# Patient Record
Sex: MALE | Race: WHITE | Employment: UNEMPLOYED | URBAN - NONMETROPOLITAN AREA
[De-identification: names, ages, dates, MRNs, and addresses within clinical notes are randomized per-mention and may not be internally consistent; named-entity substitution may affect disease eponyms.]

---

## 2019-09-21 ENCOUNTER — APPOINTMENT (OUTPATIENT)
Dept: INTERVENTIONAL RADIOLOGY/VASCULAR | Age: 59
DRG: 854 | End: 2019-09-21
Payer: COMMERCIAL

## 2019-09-21 ENCOUNTER — HOSPITAL ENCOUNTER (INPATIENT)
Age: 59
LOS: 3 days | Discharge: HOME | DRG: 854 | End: 2019-09-24
Attending: INTERNAL MEDICINE | Admitting: INTERNAL MEDICINE
Payer: COMMERCIAL

## 2019-09-21 ENCOUNTER — APPOINTMENT (OUTPATIENT)
Dept: ULTRASOUND IMAGING | Age: 59
DRG: 854 | End: 2019-09-21
Payer: COMMERCIAL

## 2019-09-21 ENCOUNTER — APPOINTMENT (OUTPATIENT)
Dept: GENERAL RADIOLOGY | Age: 59
DRG: 854 | End: 2019-09-21
Payer: COMMERCIAL

## 2019-09-21 DIAGNOSIS — R65.10 SIRS (SYSTEMIC INFLAMMATORY RESPONSE SYNDROME) (HCC): Primary | ICD-10-CM

## 2019-09-21 DIAGNOSIS — M86.172 OTHER ACUTE OSTEOMYELITIS OF LEFT FOOT (HCC): ICD-10-CM

## 2019-09-21 DIAGNOSIS — Z79.4 TYPE 2 DIABETES MELLITUS WITH DIABETIC PERIPHERAL ANGIOPATHY AND GANGRENE, WITH LONG-TERM CURRENT USE OF INSULIN (HCC): ICD-10-CM

## 2019-09-21 DIAGNOSIS — E11.52 TYPE 2 DIABETES MELLITUS WITH DIABETIC PERIPHERAL ANGIOPATHY AND GANGRENE, WITH LONG-TERM CURRENT USE OF INSULIN (HCC): ICD-10-CM

## 2019-09-21 PROBLEM — E11.59 TYPE 2 DIABETES MELLITUS WITH CIRCULATORY DISORDER (HCC): Status: ACTIVE | Noted: 2019-09-21

## 2019-09-21 PROBLEM — E87.1 HYPONATREMIA: Status: ACTIVE | Noted: 2019-09-21

## 2019-09-21 PROBLEM — E83.42 HYPOMAGNESEMIA: Status: ACTIVE | Noted: 2019-09-21

## 2019-09-21 LAB
ALBUMIN SERPL-MCNC: 3.4 G/DL (ref 3.5–5.1)
ALP BLD-CCNC: 84 U/L (ref 38–126)
ALT SERPL-CCNC: 23 U/L (ref 11–66)
ANION GAP SERPL CALCULATED.3IONS-SCNC: 14 MEQ/L (ref 8–16)
AST SERPL-CCNC: 30 U/L (ref 5–40)
AVERAGE GLUCOSE: 126 MG/DL (ref 70–126)
BACTERIA: NORMAL
BASOPHILS # BLD: 0.6 %
BASOPHILS ABSOLUTE: 0.1 THOU/MM3 (ref 0–0.1)
BILIRUB SERPL-MCNC: 0.7 MG/DL (ref 0.3–1.2)
BILIRUBIN DIRECT: < 0.2 MG/DL (ref 0–0.3)
BILIRUBIN URINE: NEGATIVE
BLOOD, URINE: NEGATIVE
BUN BLDV-MCNC: 8 MG/DL (ref 7–22)
CALCIUM SERPL-MCNC: 8.7 MG/DL (ref 8.5–10.5)
CASTS: NORMAL /LPF
CASTS: NORMAL /LPF
CHARACTER, URINE: CLEAR
CHLORIDE BLD-SCNC: 96 MEQ/L (ref 98–111)
CO2: 21 MEQ/L (ref 23–33)
COLOR: YELLOW
CREAT SERPL-MCNC: 0.7 MG/DL (ref 0.4–1.2)
CRYSTALS: NORMAL
EKG ATRIAL RATE: 99 BPM
EKG P AXIS: 45 DEGREES
EKG P-R INTERVAL: 148 MS
EKG Q-T INTERVAL: 360 MS
EKG QRS DURATION: 90 MS
EKG QTC CALCULATION (BAZETT): 462 MS
EKG R AXIS: 78 DEGREES
EKG T AXIS: 58 DEGREES
EKG VENTRICULAR RATE: 99 BPM
EOSINOPHIL # BLD: 0.6 %
EOSINOPHILS ABSOLUTE: 0.1 THOU/MM3 (ref 0–0.4)
EPITHELIAL CELLS, UA: NORMAL /HPF
ERYTHROCYTE [DISTWIDTH] IN BLOOD BY AUTOMATED COUNT: 12.8 % (ref 11.5–14.5)
ERYTHROCYTE [DISTWIDTH] IN BLOOD BY AUTOMATED COUNT: 47.6 FL (ref 35–45)
GFR SERPL CREATININE-BSD FRML MDRD: > 90 ML/MIN/1.73M2
GLUCOSE BLD-MCNC: 126 MG/DL (ref 70–108)
GLUCOSE BLD-MCNC: 133 MG/DL (ref 70–108)
GLUCOSE BLD-MCNC: 169 MG/DL (ref 70–108)
GLUCOSE BLD-MCNC: 182 MG/DL (ref 70–108)
GLUCOSE, URINE: NEGATIVE MG/DL
HBA1C MFR BLD: 6.2 % (ref 4.4–6.4)
HCT VFR BLD CALC: 36 % (ref 42–52)
HEMOGLOBIN: 12.4 GM/DL (ref 14–18)
IMMATURE GRANS (ABS): 0.06 THOU/MM3 (ref 0–0.07)
IMMATURE GRANULOCYTES: 1 %
KETONES, URINE: NEGATIVE
LACTIC ACID, SEPSIS: 1.4 MMOL/L (ref 0.5–1.9)
LACTIC ACID: 1.7 MMOL/L (ref 0.5–2.2)
LEUKOCYTE ESTERASE, URINE: NEGATIVE
LYMPHOCYTES # BLD: 7 %
LYMPHOCYTES ABSOLUTE: 0.9 THOU/MM3 (ref 1–4.8)
MAGNESIUM: 1.4 MG/DL (ref 1.6–2.4)
MCH RBC QN AUTO: 35.6 PG (ref 26–33)
MCHC RBC AUTO-ENTMCNC: 34.4 GM/DL (ref 32.2–35.5)
MCV RBC AUTO: 103.4 FL (ref 80–94)
MISCELLANEOUS LAB TEST RESULT: NORMAL
MONOCYTES # BLD: 8.1 %
MONOCYTES ABSOLUTE: 1 THOU/MM3 (ref 0.4–1.3)
NITRITE, URINE: NEGATIVE
NUCLEATED RED BLOOD CELLS: 0 /100 WBC
OSMOLALITY CALCULATION: 265.6 MOSMOL/KG (ref 275–300)
PH UA: 6.5 (ref 5–9)
PLATELET # BLD: 194 THOU/MM3 (ref 130–400)
PMV BLD AUTO: 9.9 FL (ref 9.4–12.4)
POTASSIUM SERPL-SCNC: 3.7 MEQ/L (ref 3.5–5.2)
PROTEIN UA: NEGATIVE MG/DL
RBC # BLD: 3.48 MILL/MM3 (ref 4.7–6.1)
RBC URINE: NORMAL /HPF
RENAL EPITHELIAL, UA: NORMAL
SEG NEUTROPHILS: 83.2 %
SEGMENTED NEUTROPHILS ABSOLUTE COUNT: 10.2 THOU/MM3 (ref 1.8–7.7)
SODIUM BLD-SCNC: 131 MEQ/L (ref 135–145)
SPECIFIC GRAVITY UA: 1.01 (ref 1–1.03)
TOTAL PROTEIN: 7 G/DL (ref 6.1–8)
UROBILINOGEN, URINE: 1 EU/DL (ref 0–1)
WBC # BLD: 12.3 THOU/MM3 (ref 4.8–10.8)
WBC UA: NORMAL /HPF
YEAST: NORMAL

## 2019-09-21 PROCEDURE — 80053 COMPREHEN METABOLIC PANEL: CPT

## 2019-09-21 PROCEDURE — 6370000000 HC RX 637 (ALT 250 FOR IP): Performed by: INTERNAL MEDICINE

## 2019-09-21 PROCEDURE — 97165 OT EVAL LOW COMPLEX 30 MIN: CPT

## 2019-09-21 PROCEDURE — 83605 ASSAY OF LACTIC ACID: CPT

## 2019-09-21 PROCEDURE — 99284 EMERGENCY DEPT VISIT MOD MDM: CPT

## 2019-09-21 PROCEDURE — 87077 CULTURE AEROBIC IDENTIFY: CPT

## 2019-09-21 PROCEDURE — 6360000002 HC RX W HCPCS: Performed by: NURSE PRACTITIONER

## 2019-09-21 PROCEDURE — 83735 ASSAY OF MAGNESIUM: CPT

## 2019-09-21 PROCEDURE — 0JBR0ZZ EXCISION OF LEFT FOOT SUBCUTANEOUS TISSUE AND FASCIA, OPEN APPROACH: ICD-10-PCS | Performed by: PODIATRIST

## 2019-09-21 PROCEDURE — 6370000000 HC RX 637 (ALT 250 FOR IP): Performed by: NURSE PRACTITIONER

## 2019-09-21 PROCEDURE — 2709999900 HC NON-CHARGEABLE SUPPLY

## 2019-09-21 PROCEDURE — 36415 COLL VENOUS BLD VENIPUNCTURE: CPT

## 2019-09-21 PROCEDURE — 81001 URINALYSIS AUTO W/SCOPE: CPT

## 2019-09-21 PROCEDURE — 82948 REAGENT STRIP/BLOOD GLUCOSE: CPT

## 2019-09-21 PROCEDURE — 93971 EXTREMITY STUDY: CPT

## 2019-09-21 PROCEDURE — 6360000002 HC RX W HCPCS: Performed by: INTERNAL MEDICINE

## 2019-09-21 PROCEDURE — 96365 THER/PROPH/DIAG IV INF INIT: CPT

## 2019-09-21 PROCEDURE — 82248 BILIRUBIN DIRECT: CPT

## 2019-09-21 PROCEDURE — 1200000003 HC TELEMETRY R&B

## 2019-09-21 PROCEDURE — 87070 CULTURE OTHR SPECIMN AEROBIC: CPT

## 2019-09-21 PROCEDURE — 83036 HEMOGLOBIN GLYCOSYLATED A1C: CPT

## 2019-09-21 PROCEDURE — 85025 COMPLETE CBC W/AUTO DIFF WBC: CPT

## 2019-09-21 PROCEDURE — 87147 CULTURE TYPE IMMUNOLOGIC: CPT

## 2019-09-21 PROCEDURE — 87075 CULTR BACTERIA EXCEPT BLOOD: CPT

## 2019-09-21 PROCEDURE — 99223 1ST HOSP IP/OBS HIGH 75: CPT | Performed by: INTERNAL MEDICINE

## 2019-09-21 PROCEDURE — 87040 BLOOD CULTURE FOR BACTERIA: CPT

## 2019-09-21 PROCEDURE — 87186 SC STD MICRODIL/AGAR DIL: CPT

## 2019-09-21 PROCEDURE — 71046 X-RAY EXAM CHEST 2 VIEWS: CPT

## 2019-09-21 PROCEDURE — 87205 SMEAR GRAM STAIN: CPT

## 2019-09-21 PROCEDURE — 94760 N-INVAS EAR/PLS OXIMETRY 1: CPT

## 2019-09-21 PROCEDURE — 2580000003 HC RX 258: Performed by: INTERNAL MEDICINE

## 2019-09-21 PROCEDURE — 93005 ELECTROCARDIOGRAM TRACING: CPT | Performed by: INTERNAL MEDICINE

## 2019-09-21 PROCEDURE — 2580000003 HC RX 258: Performed by: NURSE PRACTITIONER

## 2019-09-21 PROCEDURE — 73630 X-RAY EXAM OF FOOT: CPT

## 2019-09-21 RX ORDER — POTASSIUM CHLORIDE 20 MEQ/1
40 TABLET, EXTENDED RELEASE ORAL PRN
Status: DISCONTINUED | OUTPATIENT
Start: 2019-09-21 | End: 2019-09-24 | Stop reason: HOSPADM

## 2019-09-21 RX ORDER — 0.9 % SODIUM CHLORIDE 0.9 %
1000 INTRAVENOUS SOLUTION INTRAVENOUS ONCE
Status: COMPLETED | OUTPATIENT
Start: 2019-09-21 | End: 2019-09-21

## 2019-09-21 RX ORDER — DEXTROSE MONOHYDRATE 50 MG/ML
100 INJECTION, SOLUTION INTRAVENOUS PRN
Status: DISCONTINUED | OUTPATIENT
Start: 2019-09-21 | End: 2019-09-24 | Stop reason: HOSPADM

## 2019-09-21 RX ORDER — ONDANSETRON 2 MG/ML
4 INJECTION INTRAMUSCULAR; INTRAVENOUS EVERY 6 HOURS PRN
Status: DISCONTINUED | OUTPATIENT
Start: 2019-09-21 | End: 2019-09-24 | Stop reason: HOSPADM

## 2019-09-21 RX ORDER — ACETAMINOPHEN 325 MG/1
650 TABLET ORAL ONCE
Status: COMPLETED | OUTPATIENT
Start: 2019-09-21 | End: 2019-09-21

## 2019-09-21 RX ORDER — ACETAMINOPHEN 325 MG/1
650 TABLET ORAL EVERY 4 HOURS PRN
Status: DISCONTINUED | OUTPATIENT
Start: 2019-09-21 | End: 2019-09-24 | Stop reason: HOSPADM

## 2019-09-21 RX ORDER — SODIUM CHLORIDE 0.9 % (FLUSH) 0.9 %
10 SYRINGE (ML) INJECTION EVERY 12 HOURS SCHEDULED
Status: DISCONTINUED | OUTPATIENT
Start: 2019-09-21 | End: 2019-09-24 | Stop reason: HOSPADM

## 2019-09-21 RX ORDER — SODIUM CHLORIDE 9 MG/ML
INJECTION, SOLUTION INTRAVENOUS CONTINUOUS
Status: DISCONTINUED | OUTPATIENT
Start: 2019-09-21 | End: 2019-09-24 | Stop reason: HOSPADM

## 2019-09-21 RX ORDER — POLYETHYLENE GLYCOL 3350 17 G/17G
17 POWDER, FOR SOLUTION ORAL DAILY
Status: DISCONTINUED | OUTPATIENT
Start: 2019-09-21 | End: 2019-09-24 | Stop reason: HOSPADM

## 2019-09-21 RX ORDER — DEXTROSE MONOHYDRATE 25 G/50ML
12.5 INJECTION, SOLUTION INTRAVENOUS PRN
Status: DISCONTINUED | OUTPATIENT
Start: 2019-09-21 | End: 2019-09-24 | Stop reason: HOSPADM

## 2019-09-21 RX ORDER — KETOROLAC TROMETHAMINE 30 MG/ML
30 INJECTION, SOLUTION INTRAMUSCULAR; INTRAVENOUS ONCE
Status: COMPLETED | OUTPATIENT
Start: 2019-09-21 | End: 2019-09-21

## 2019-09-21 RX ORDER — SODIUM CHLORIDE 0.9 % (FLUSH) 0.9 %
10 SYRINGE (ML) INJECTION PRN
Status: DISCONTINUED | OUTPATIENT
Start: 2019-09-21 | End: 2019-09-24 | Stop reason: HOSPADM

## 2019-09-21 RX ORDER — NICOTINE POLACRILEX 4 MG
15 LOZENGE BUCCAL PRN
Status: DISCONTINUED | OUTPATIENT
Start: 2019-09-21 | End: 2019-09-24 | Stop reason: HOSPADM

## 2019-09-21 RX ORDER — POTASSIUM CHLORIDE 7.45 MG/ML
10 INJECTION INTRAVENOUS PRN
Status: DISCONTINUED | OUTPATIENT
Start: 2019-09-21 | End: 2019-09-24 | Stop reason: HOSPADM

## 2019-09-21 RX ORDER — FAMOTIDINE 20 MG/1
20 TABLET, FILM COATED ORAL 2 TIMES DAILY
Status: DISCONTINUED | OUTPATIENT
Start: 2019-09-21 | End: 2019-09-24 | Stop reason: HOSPADM

## 2019-09-21 RX ADMIN — INSULIN LISPRO 2 UNITS: 100 INJECTION, SOLUTION INTRAVENOUS; SUBCUTANEOUS at 18:04

## 2019-09-21 RX ADMIN — SODIUM CHLORIDE 1000 ML: 9 INJECTION, SOLUTION INTRAVENOUS at 08:45

## 2019-09-21 RX ADMIN — CEFTRIAXONE SODIUM 1 G: 1 INJECTION, POWDER, FOR SOLUTION INTRAMUSCULAR; INTRAVENOUS at 10:33

## 2019-09-21 RX ADMIN — ACETAMINOPHEN 650 MG: 325 TABLET ORAL at 21:17

## 2019-09-21 RX ADMIN — VANCOMYCIN HYDROCHLORIDE 1500 MG: 1 INJECTION, POWDER, LYOPHILIZED, FOR SOLUTION INTRAVENOUS at 11:25

## 2019-09-21 RX ADMIN — MAGNESIUM SULFATE HEPTAHYDRATE 1 G: 500 INJECTION, SOLUTION INTRAMUSCULAR; INTRAVENOUS at 12:42

## 2019-09-21 RX ADMIN — CEFTRIAXONE SODIUM 1 G: 1 INJECTION, POWDER, FOR SOLUTION INTRAMUSCULAR; INTRAVENOUS at 21:19

## 2019-09-21 RX ADMIN — SODIUM CHLORIDE: 9 INJECTION, SOLUTION INTRAVENOUS at 12:39

## 2019-09-21 RX ADMIN — ACETAMINOPHEN 650 MG: 325 TABLET ORAL at 08:45

## 2019-09-21 RX ADMIN — KETOROLAC TROMETHAMINE 30 MG: 30 INJECTION, SOLUTION INTRAMUSCULAR at 11:19

## 2019-09-21 RX ADMIN — ACETAMINOPHEN 650 MG: 325 TABLET ORAL at 15:05

## 2019-09-21 RX ADMIN — FAMOTIDINE 20 MG: 20 TABLET ORAL at 21:18

## 2019-09-21 RX ADMIN — VANCOMYCIN HYDROCHLORIDE 1750 MG: 5 INJECTION, POWDER, LYOPHILIZED, FOR SOLUTION INTRAVENOUS at 21:11

## 2019-09-21 ASSESSMENT — ENCOUNTER SYMPTOMS
VOMITING: 0
WHEEZING: 0
ABDOMINAL DISTENTION: 0
CHEST TIGHTNESS: 0
DIARRHEA: 0
CONSTIPATION: 0
NAUSEA: 0
SHORTNESS OF BREATH: 0
EYE REDNESS: 0
BACK PAIN: 0
COUGH: 0
VOICE CHANGE: 0
COLOR CHANGE: 1
RHINORRHEA: 0
SORE THROAT: 0
BLOOD IN STOOL: 0
PHOTOPHOBIA: 0
ABDOMINAL PAIN: 0
SINUS PRESSURE: 0

## 2019-09-21 ASSESSMENT — PAIN DESCRIPTION - LOCATION
LOCATION: LEG;FOOT
LOCATION: FOOT
LOCATION: LEG;FOOT
LOCATION: FOOT

## 2019-09-21 ASSESSMENT — PAIN DESCRIPTION - FREQUENCY
FREQUENCY: CONTINUOUS

## 2019-09-21 ASSESSMENT — PAIN SCALES - GENERAL
PAINLEVEL_OUTOF10: 5
PAINLEVEL_OUTOF10: 6
PAINLEVEL_OUTOF10: 5
PAINLEVEL_OUTOF10: 4

## 2019-09-21 ASSESSMENT — PAIN DESCRIPTION - PROGRESSION
CLINICAL_PROGRESSION: NOT CHANGED
CLINICAL_PROGRESSION: NOT CHANGED

## 2019-09-21 ASSESSMENT — PAIN DESCRIPTION - PAIN TYPE
TYPE: ACUTE PAIN

## 2019-09-21 ASSESSMENT — PAIN DESCRIPTION - DESCRIPTORS
DESCRIPTORS: ACHING
DESCRIPTORS: SHOOTING;THROBBING
DESCRIPTORS: ACHING
DESCRIPTORS: ACHING;SHARP

## 2019-09-21 ASSESSMENT — PAIN DESCRIPTION - ONSET
ONSET: ON-GOING
ONSET: ON-GOING

## 2019-09-21 ASSESSMENT — PAIN DESCRIPTION - ORIENTATION
ORIENTATION: LEFT

## 2019-09-21 ASSESSMENT — PAIN - FUNCTIONAL ASSESSMENT
PAIN_FUNCTIONAL_ASSESSMENT: PREVENTS OR INTERFERES SOME ACTIVE ACTIVITIES AND ADLS
PAIN_FUNCTIONAL_ASSESSMENT: PREVENTS OR INTERFERES SOME ACTIVE ACTIVITIES AND ADLS

## 2019-09-21 ASSESSMENT — PAIN DESCRIPTION - DIRECTION: RADIATING_TOWARDS: LEFT LEG

## 2019-09-21 NOTE — CONSULTS
Podiatric Surgery Consult    Reason for Consult: Sepsis with left foot wound revealed    CHIEF COMPLAINT: Left foot pain    HISTORY OF PRESENT ILLNESS:                The patient is a [address] y.o. male with significant past medical history of previous DVT history of multiple knee operations and diabetes mellitus uncontrolled who being seen at bedside on behalf of of Dr. Kayden Durbin. She presents today with a hot swollen left foot with a wound on the medial side of his foot and big toe. Patient states he is from [place] and is here for work. He woke up this morning and had fever and chills and felt ill with an incredible amount of pain in his left leg and groin. Patient states that he does not see a podiatrist back in [place] and does not really do much for his feet. He says occasionally that he will do Epson salt soaks and put powder on his feet. Patient denies any nausea, vomiting chest pain or shortness of breath. Patient also admits to numbness and tingling in both feet. Past Medical History:        Diagnosis Date    Diabetes mellitus ([facility] [address]      Past Surgical History:    History reviewed. No pertinent surgical history. Current Medications:    Current Facility-Administered Medications: cefTRIAXone (ROCEPHIN) 1 g IVPB in 50 mL D5W minibag, 1 g, Intravenous, Once  vancomycin (VANCOCIN) 1,500 mg in dextrose 5 % 500 mL IVPB, 1,500 mg, Intravenous, Once  Allergies:  Patient has no known allergies. Social History:    TOBACCO: Unknown  Family History:   No family history on file. REVIEW OF SYSTEMS:    CONSTITUTIONAL:  positive for  fevers, chills and malaise    PHYSICAL EXAM:    General Appearance: Awake, alert, and oriented. In no acute distress. Well nourished. Resting in bed. Respiratory: Non-labored breathing. Neuro: Normal speech.  No focal findings  Vitals:    BP (!) 171/81   Pulse 104   Temp 103.1 °F (39.5 °C) (Oral)   Resp 18   Ht 6' 3\" (1.905 m)   Wt 270 lb (122.5 kg)   BMI 33.75 kg/m²     Exam:

## 2019-09-21 NOTE — PROGRESS NOTES
Pharmacy Note  Vancomycin Consult    Augusto Calvo is a 60 y.o. male started on Vancomycin for osteomyelitis or foot; consult received from Dr. Uzma Wright to manage therapy. Also receiving the following antibiotics: ceftriaxone. Patient Active Problem List   Diagnosis    Osteomyelitis of ankle or foot, acute, left (HCC)    SIRS (systemic inflammatory response syndrome) (HCC)    Type 2 diabetes mellitus with circulatory disorder (HCC)    Hyponatremia    Hypomagnesemia       Allergies:  Patient has no known allergies. Temp max: 103.1    Recent Labs     09/21/19  0928   BUN 8       Recent Labs     09/21/19  0928   CREATININE 0.7       Recent Labs     09/21/19  0928   WBC 12.3*       No intake or output data in the 24 hours ending 09/21/19 1159    Culture Date Source Results   9/21/19 Blood #1 Sent   9/21/19 Blood #2 Sent   9/21/19 Anaerobic / aerobic  Sent       Ht Readings from Last 1 Encounters:   09/21/19 6' 3\" (1.905 m)        Wt Readings from Last 1 Encounters:   09/21/19 270 lb (122.5 kg)         Body mass index is 33.75 kg/m². CrCl: 158 mL/min      Assessment/Plan:  Patient received vancomycin IV 1500mg x1 in ER on 9/21/19 at 1125  Will initiate vancomycin 1750 mg IV every 12 hours. Timing of trough level will be determined based on culture results, renal function, and clinical response. Will plan on checking a level prior to the 4th dose - not ordered at this time. Thank you for the consult. Will continue to follow and monitor.    Suly Forbes, Lucy 9/21/2019 12:02 PM

## 2019-09-21 NOTE — ED NOTES
Patient back from [address] [place] [address] Rd[address] in stable condition, no concerns voiced at  This time     Wanda Dominguez RN  09/21/19 4446

## 2019-09-21 NOTE — PROGRESS NOTES
ambulation    Transfers:  Sit to stand: Modified independent  Stand to sit: Modified independent  Toilet Transfers  Toilet - Technique: Ambulating  Equipment Used: Grab bars  Toilet Transfer: Modified independent    Upper Extremity Assessment:   LUE AROM : WFL  RUE AROM : WNL    LUE Strength  Gross LUE Strength: WFL  RUE Strength  Gross RUE Strength: WFL    Sensation  Overall Sensation Status: WNL  RUE Tone: Normotonic  LUE Tone: Normotonic       Activity Tolerance: Patient Tolerated treatment well       Assessment:  Assessment: Pt. seen for OT eval only as pt at baseline with self care performance. OT signing off on POC. Prognosis: Good  REQUIRES OT FOLLOW UP: No  No Skilled OT: At baseline function, Independent with ADL's, Independent with functional mobility  Decision Making: Low Complexity  Safety Devices in place: Yes  Type of devices: All fall risk precautions in place, Left in bed, Patient at risk for falls, Call light within reach, Bed alarm in place  Restraints  Initially in place: No    Treatment Initiated: Treatment and education initiated within context of evaluation. Evaluation time included review of current medical information, gathering information related to past medical, social and functional history, completion of standardized testing, formal and informal observation of tasks, assessment of data and development of plan of care and goals. Treatment time included skilled education and facilitation of tasks to increase safety and independence with ADL's for improved functional independence and quality of life. Discharge Recommendations:   Pt. At baseline with ADLs, no further OT warranted at this time. Patient Education:  OT Education: OT Role, Plan of Care    Equipment Recommendations:   none at this time. Plan:  Times per week: n/a    Goals:  Patient goals : to go home  Short term goals  Time Frame for Short term goals: no goals for pt d/t pt at baseline, OT eval only.

## 2019-09-21 NOTE — H&P
sexual activity: None   Other Topics Concern    None   Social History Narrative    None      Allergies: Patient has no known allergies. Medications:       vancomycin  1,500 mg Intravenous Once    ketorolac  30 mg Intravenous Once       PHYSICAL EXAM:    /73   Pulse 86   Temp 99.5 °F (37.5 °C) (Oral)   Resp 18   Ht 6' 3\" (1.905 m)   Wt 270 lb (122.5 kg)   SpO2 98%   BMI 33.75 kg/m²     General appearance:  No apparent distress, appears stated age and cooperative. HEENT:  Normal cephalic, atraumatic without obvious deformity. Pupils equal, round, and reactive to light. Extra ocular muscles intact. Conjunctivae/corneas clear. Neck: Supple, with full range of motion. no jugular venous distention. Trachea midline. no carotid bruits  Respiratory:  Normal respiratory effort. Clear to auscultation, bilaterally without Rales/Wheezes/Rhonchi. Breath sounds equal bilaterally  Cardiovascular:  Regular rate and rhythm with normal S1/S2 without murmurs, rubs or gallops. PMI non displaced  Abdomen: Soft, non-tender, non-distended with normal bowel sounds. No guarding, rebound. Musculoskeletal:  dfu L foot wrapped  Skin: DFU wrapped  Neurologic:  Neurovascularly intact without any focal sensory/motor deficits. Cranial nerves: II-XII intact, grossly non-focal.  Psychiatric:  Alert and oriented, thought content appropriate, normal insight  Capillary Refill: Brisk,< 2 seconds   Peripheral Pulses: +2 palpable, equal bilaterally upper and lower extremities  Lymphatics: L groin large node lymphadenopathy      Data: (All radiographs, tracings, PFTs, and imaging are personally viewed and interpreted unless otherwise noted).     Wbc 12.3   Lactic acid < 2   Tennis ball size l groin node     Recent Labs     09/21/19  0928   WBC 12.3*   HGB 12.4*   HCT 36.0*         Recent Labs     09/21/19  0928   *   K 3.7   CL 96*   CO2 21*   BUN 8   CREATININE 0.7   CALCIUM 8.7      Recent Labs     09/21/19  8439

## 2019-09-22 LAB
ANION GAP SERPL CALCULATED.3IONS-SCNC: 12 MEQ/L (ref 8–16)
BASOPHILS # BLD: 0.6 %
BASOPHILS ABSOLUTE: 0.1 THOU/MM3 (ref 0–0.1)
BUN BLDV-MCNC: 7 MG/DL (ref 7–22)
CALCIUM SERPL-MCNC: 8.6 MG/DL (ref 8.5–10.5)
CHLORIDE BLD-SCNC: 99 MEQ/L (ref 98–111)
CO2: 23 MEQ/L (ref 23–33)
CREAT SERPL-MCNC: 0.8 MG/DL (ref 0.4–1.2)
EOSINOPHIL # BLD: 0.5 %
EOSINOPHILS ABSOLUTE: 0.1 THOU/MM3 (ref 0–0.4)
ERYTHROCYTE [DISTWIDTH] IN BLOOD BY AUTOMATED COUNT: 12.7 % (ref 11.5–14.5)
ERYTHROCYTE [DISTWIDTH] IN BLOOD BY AUTOMATED COUNT: 47.8 FL (ref 35–45)
GFR SERPL CREATININE-BSD FRML MDRD: > 90 ML/MIN/1.73M2
GLUCOSE BLD-MCNC: 101 MG/DL (ref 70–108)
GLUCOSE BLD-MCNC: 118 MG/DL (ref 70–108)
GLUCOSE BLD-MCNC: 121 MG/DL (ref 70–108)
GLUCOSE BLD-MCNC: 133 MG/DL (ref 70–108)
GLUCOSE BLD-MCNC: 227 MG/DL (ref 70–108)
HCT VFR BLD CALC: 37.9 % (ref 42–52)
HEMOGLOBIN: 12.8 GM/DL (ref 14–18)
IMMATURE GRANS (ABS): 0.12 THOU/MM3 (ref 0–0.07)
IMMATURE GRANULOCYTES: 1 %
LACTIC ACID: 1.1 MMOL/L (ref 0.5–2.2)
LYMPHOCYTES # BLD: 9.8 %
LYMPHOCYTES ABSOLUTE: 1.6 THOU/MM3 (ref 1–4.8)
MAGNESIUM: 1.7 MG/DL (ref 1.6–2.4)
MCH RBC QN AUTO: 34.7 PG (ref 26–33)
MCHC RBC AUTO-ENTMCNC: 33.8 GM/DL (ref 32.2–35.5)
MCV RBC AUTO: 102.7 FL (ref 80–94)
MONOCYTES # BLD: 7.2 %
MONOCYTES ABSOLUTE: 1.2 THOU/MM3 (ref 0.4–1.3)
NUCLEATED RED BLOOD CELLS: 0 /100 WBC
PLATELET # BLD: 174 THOU/MM3 (ref 130–400)
PMV BLD AUTO: 10.2 FL (ref 9.4–12.4)
POTASSIUM REFLEX MAGNESIUM: 3.7 MEQ/L (ref 3.5–5.2)
RBC # BLD: 3.69 MILL/MM3 (ref 4.7–6.1)
SEG NEUTROPHILS: 81.2 %
SEGMENTED NEUTROPHILS ABSOLUTE COUNT: 13.2 THOU/MM3 (ref 1.8–7.7)
SODIUM BLD-SCNC: 134 MEQ/L (ref 135–145)
WBC # BLD: 16.2 THOU/MM3 (ref 4.8–10.8)

## 2019-09-22 PROCEDURE — 6360000002 HC RX W HCPCS: Performed by: INTERNAL MEDICINE

## 2019-09-22 PROCEDURE — 97161 PT EVAL LOW COMPLEX 20 MIN: CPT

## 2019-09-22 PROCEDURE — 99233 SBSQ HOSP IP/OBS HIGH 50: CPT | Performed by: INTERNAL MEDICINE

## 2019-09-22 PROCEDURE — 85025 COMPLETE CBC W/AUTO DIFF WBC: CPT

## 2019-09-22 PROCEDURE — 80048 BASIC METABOLIC PNL TOTAL CA: CPT

## 2019-09-22 PROCEDURE — 83605 ASSAY OF LACTIC ACID: CPT

## 2019-09-22 PROCEDURE — 83735 ASSAY OF MAGNESIUM: CPT

## 2019-09-22 PROCEDURE — 2580000003 HC RX 258: Performed by: INTERNAL MEDICINE

## 2019-09-22 PROCEDURE — 6370000000 HC RX 637 (ALT 250 FOR IP): Performed by: INTERNAL MEDICINE

## 2019-09-22 PROCEDURE — 1200000003 HC TELEMETRY R&B

## 2019-09-22 PROCEDURE — 82948 REAGENT STRIP/BLOOD GLUCOSE: CPT

## 2019-09-22 PROCEDURE — 36415 COLL VENOUS BLD VENIPUNCTURE: CPT

## 2019-09-22 RX ORDER — DOCUSATE SODIUM 100 MG/1
100 CAPSULE, LIQUID FILLED ORAL DAILY
Status: DISCONTINUED | OUTPATIENT
Start: 2019-09-22 | End: 2019-09-22

## 2019-09-22 RX ADMIN — VANCOMYCIN HYDROCHLORIDE 1750 MG: 5 INJECTION, POWDER, LYOPHILIZED, FOR SOLUTION INTRAVENOUS at 09:20

## 2019-09-22 RX ADMIN — ACETAMINOPHEN 650 MG: 325 TABLET ORAL at 04:10

## 2019-09-22 RX ADMIN — PIPERACILLIN AND TAZOBACTAM 3.38 G: 3; .375 INJECTION, POWDER, LYOPHILIZED, FOR SOLUTION INTRAVENOUS at 09:20

## 2019-09-22 RX ADMIN — INSULIN LISPRO 4 UNITS: 100 INJECTION, SOLUTION INTRAVENOUS; SUBCUTANEOUS at 13:53

## 2019-09-22 RX ADMIN — PIPERACILLIN AND TAZOBACTAM 3.38 G: 3; .375 INJECTION, POWDER, LYOPHILIZED, FOR SOLUTION INTRAVENOUS at 18:22

## 2019-09-22 RX ADMIN — ACETAMINOPHEN 650 MG: 325 TABLET ORAL at 16:03

## 2019-09-22 RX ADMIN — ONDANSETRON 4 MG: 2 INJECTION INTRAMUSCULAR; INTRAVENOUS at 04:10

## 2019-09-22 RX ADMIN — SODIUM CHLORIDE: 9 INJECTION, SOLUTION INTRAVENOUS at 04:03

## 2019-09-22 RX ADMIN — FAMOTIDINE 20 MG: 20 TABLET ORAL at 22:05

## 2019-09-22 RX ADMIN — VANCOMYCIN HYDROCHLORIDE 1750 MG: 5 INJECTION, POWDER, LYOPHILIZED, FOR SOLUTION INTRAVENOUS at 22:03

## 2019-09-22 RX ADMIN — SODIUM CHLORIDE, PRESERVATIVE FREE 10 ML: 5 INJECTION INTRAVENOUS at 22:06

## 2019-09-22 ASSESSMENT — PAIN DESCRIPTION - DIRECTION
RADIATING_TOWARDS: LEFT LEG
RADIATING_TOWARDS: LEFT LEG

## 2019-09-22 ASSESSMENT — PAIN SCALES - GENERAL
PAINLEVEL_OUTOF10: 5
PAINLEVEL_OUTOF10: 3
PAINLEVEL_OUTOF10: 5
PAINLEVEL_OUTOF10: 5
PAINLEVEL_OUTOF10: 4

## 2019-09-22 ASSESSMENT — PAIN DESCRIPTION - ONSET
ONSET: ON-GOING
ONSET: ON-GOING

## 2019-09-22 ASSESSMENT — PAIN DESCRIPTION - FREQUENCY
FREQUENCY: INTERMITTENT
FREQUENCY: CONTINUOUS

## 2019-09-22 ASSESSMENT — PAIN DESCRIPTION - PAIN TYPE
TYPE: ACUTE PAIN
TYPE: ACUTE PAIN

## 2019-09-22 ASSESSMENT — PAIN DESCRIPTION - LOCATION
LOCATION: FOOT
LOCATION: FOOT

## 2019-09-22 ASSESSMENT — PAIN DESCRIPTION - DESCRIPTORS
DESCRIPTORS: SHOOTING
DESCRIPTORS: SHOOTING;ACHING

## 2019-09-22 ASSESSMENT — PAIN DESCRIPTION - PROGRESSION
CLINICAL_PROGRESSION: NOT CHANGED
CLINICAL_PROGRESSION: NOT CHANGED

## 2019-09-22 ASSESSMENT — PAIN DESCRIPTION - ORIENTATION
ORIENTATION: LEFT
ORIENTATION: LEFT

## 2019-09-22 NOTE — PROGRESS NOTES
[address] [address]  INPATIENT PHYSICAL THERAPY  EVALUATION  [facility] ORTHOPEDICS 7K - 7K-20/020-A    Time In: 8452  Time Out: 3823  Timed Code Treatment Minutes: 8 Minutes  Minutes: 23     Date: 2019  Patient Name: Augusto Calvo,  Gender:  male        MRN: [id number]  : 1960  (60 y.o.)      Referring Practitioner: Frandy Weems DO  Diagnosis: osteomyelitis of ankle or foot, acute, left  Additional Pertinent Hx: Patient is a 60year old male seen s/p I&D of left MPJ and IPJ of 1st ray. He has a significant past medical history of previous DVT history of multiple knee operations and diabetes mellitus uncontrolled He presented with a hot swollen left foot with a wound on the medial side of his foot and big toe. Patient is from [place] and is here for work. Restrictions/Precautions:  Restrictions/Precautions: General Precautions, Weight Bearing  Left Lower Extremity Weight Bearing: Weight Bearing As Tolerated(WB through heel only)    Subjective:  Chart Reviewed: Yes  Patient assessed for rehabilitation services?: Yes  Family / Caregiver Present: No  Subjective: RN approved session. Patient resting in bed upon arrival, agreeable to therapy. General:  Overall Orientation Status: Within Functional Limits  Follows Commands: Within Functional Limits    Vision: Impaired  Vision Exceptions: Wears glasses for reading    Hearing: Within functional limits    Pain:  Yes.      Pre Treatment Pain Screening  Pain at present: 4  Scale Used: Numeric Score  Intervention List: Patient able to continue with treatment    Social/Functional History:    Lives With: Spouse  Type of Home: House  Home Layout: One level  Home Access: Stairs to enter with rails  Entrance Stairs - Number of Steps: 5  Entrance Stairs - Rails: Both  Home Equipment: Crutches     Bathroom Shower/Tub: Tub/Shower unit  Bathroom Toilet: Standard  Bathroom Accessibility: Accessible       ADL Assistance: Independent  Homemaking Assistance:

## 2019-09-22 NOTE — PROGRESS NOTES
Assessment and Plan:        Sepsis due to OM of left great toe: 2/2 DFU, chronic, possibly chronic OM? Consideration given to PSA given diabetic foot wound, Aggressively spread per history with evidence of lymphangitis. Now growing Proteus and GPC's.  - continue vanco and now zosyn for PSA coverage.   - follow cultures  - Monitor hemodynamics, s/p IVFs  - lactic cleared  - ID consulted as he will likely need prolonged course of abx  - Podiatry consulted     T2DM: He states he takes 20 units toujeo about every other day. A1c in 2017 was 12.9, now 6.2 here. He endorses subjective hypoglycemia. Sugars fairly well controlled here without toujeo. Continue SSI, may not need toujeo moving forward. Educated on insulin. HTN: reported per patient. Hypotensive here, likely sepsis related. Monitor    Macrocytic anemia: monitor. CC:  Foot ulcer  Hospital course: History of T2DM, questionable compliance, and bilateral diabetic foot wounds. 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 He has been dealing with DFU's for past year. Prior to admission had a particularly rough day at work and at home noticed that left foot had appearance of being infected. Also developed significant lymphadenopathy in left groin at that time. Thus he came to the ED where he was found to be septic. Admitted for further care. Subjective: (12 point review of systems completed. Pertinent positives noted. Otherwise ROS is negative) : Feeling improved, still with a headache behind his eyes. Groin lymphadenopathy improving, fevers, improving, sweaty this AM but feels slightly better.    PMH:  Per HPI  Medications:     dextrose      sodium chloride 75 mL/hr at 09/22/19 0403      piperacillin-tazobactam  3.375 g Intravenous Q8H    sodium chloride flush  10 mL Intravenous 2 times per day    enoxaparin  40 mg Subcutaneous Q24H    polyethylene glycol  17 g Oral Daily    famotidine  20 mg Oral BID   

## 2019-09-22 NOTE — PLAN OF CARE
Problem: Pain:  Goal: Pain level will decrease  Description  Pain level will decrease  Outcome: Ongoing  Note:   Patient rates pain 6/10 with a pain goal of no pain, 0/10. Pain controlled with medication and repositioning. Patient satisfied. Problem: Neurological  Goal: Maximum potential motor/sensory/cognitive function  Outcome: Ongoing  Note:   Patient is alert and oriented x4. All extremities have pulses of +1 or +2. Patient has numbness and tingling to bilateral feet d/t neuropathy. All extremities active. Problem: Cardiovascular  Goal: No DVT, peripheral vascular complications  Outcome: Ongoing  Note:   No signs and symptoms of DVT. Calves soft and nontender. Patient noncompliant with SCDs and refused lovenox today. Problem: Respiratory  Goal: No pulmonary complications  Outcome: Ongoing  Note:   Lung sounds clear and chest expansion symmetrical. O2 sats are 92% or greater on room air. Problem: GI  Goal: No bowel complications  Outcome: Ongoing  Note:   Patient denies any n/v/d. Bowel sounds active x4 quadrants. No bowel movement yet this shift. Problem:   Goal: Adequate urinary output  Outcome: Ongoing  Note:   Patient has adequate clear, yellow urine using urinal. Denies any dysuria or urgency. Problem: Nutrition  Goal: Optimal nutrition therapy  Outcome: Ongoing  Note:   Patient on general diet tolerating well. Denies any n/v.     Problem: Skin Integrity/Risk  Goal: No skin breakdown during hospitalization  Outcome: Ongoing  Note:   No signs of new skin breakdown with each assessment. Skin remains warm, dry, intact. Mucous membranes pink & moist. Patient understands the importance of frequent repositioning in order to prevent any skin breakdown. Wound to left great toe, splint in place and wrapped in ACE bandage to left foot. Callus along side of right great toe/foot.       Problem: Musculor/Skeletal Functional Status  Goal: Highest potential functional level  Outcome:

## 2019-09-22 NOTE — CONSULTS
CONSULTATION NOTE :ID       Patient - Gregoria Suarez,  Age - 60 y.o.    - 1960      Room Number - 7K-20/020-A   MRN -  [id number]   Acct # - [de-identified]  Date of Admission -  2019  8:21 AM  Patient's PCP: No primary care provider on file. Requesting Physician: Arthur De Luna MD    REASON FOR CONSULTATION   Sepsis osteomyelitis of left hallux    HISTORY OF PRESENT ILLNESS       This is a very pleasant 60 y.o. male who was admitted to the hospital with a chief complaints of pain swelling on his left foot with redness and enlarged left groin lymph node. He reported that he had a wound on his left foot for over a year he is from [place] he is here for work. He does not have any regular doctor. He is diabetic and takes oral medications. He smokes. He has not been following with a podiatrist or wound specialist to address his left foot. He had ulcer in the past that failed however since January he has been dealing with a wound he noted fever chills and redness and swelling on his left foot for which reason he came to the hospital.  His x-ray of the foot shows erosive changes on his left hallux. His ultrasound showed enlarged lymph node on his left groin. He reported that it was very painful he has neuropathy on his foot denies history of PVD. He is on broad-spectrum antibiotics. Wound culture is pending. PAST MEDICAL  HISTORY       Past Medical History:   Diagnosis Date    Diabetes mellitus ([address]        PAST SURGICAL HISTORY     History reviewed. No pertinent surgical history.       MEDICATIONS:       Scheduled Meds:   piperacillin-tazobactam  3.375 g Intravenous Q8H    sodium chloride flush  10 mL Intravenous 2 times per day    enoxaparin  40 mg Subcutaneous Q24H    polyethylene glycol  17 g Oral Daily    famotidine  20 mg Oral BID    insulin lispro  0-12 Units Subcutaneous TID     insulin lispro  0-6 Units Subcutaneous Nightly    vancomycin

## 2019-09-22 NOTE — PROGRESS NOTES
Podiatric Progress Note  Jennifer Baugh  Subjective :   9/22/19  Patient seen chair-side today behalf of Dr. Ruther Carrel. Patient appeared pleasant, was oriented to person, place and time and in no acute distress. Pt states his fever has been up and down all night ranging from 102 to 99. Pt relates he is feeling much better today and is in less pain. Patient denies any /V/C/SOB or CP but admits to fevers and nausea. Patient has no further pedal concerns at this point in time. HISTORY OF PRESENT ILLNESS:                The patient is a 60 y.o. male with significant past medical history of previous DVT history of multiple knee operations and diabetes mellitus uncontrolled who being seen at bedside on behalf of of Dr. Ruther Carrel. She presents today with a hot swollen left foot with a wound on the medial side of his foot and big toe. Patient states he is from [place] and is here for work. He woke up this morning and had fever and chills and felt ill with an incredible amount of pain in his left leg and groin. Patient states that he does not see a podiatrist back in [place] and does not really do much for his feet. He says occasionally that he will do Epson salt soaks and put powder on his feet. Patient denies any nausea, vomiting chest pain or shortness of breath. Patient also admits to numbness and tingling in both feet.      Current Medications:    Current Facility-Administered Medications: piperacillin-tazobactam (ZOSYN) 3.375 g in dextrose 5 % 50 mL IVPB extended infusion (mini-bag), 3.375 g, Intravenous, Q8H  sodium chloride flush 0.9 % injection 10 mL, 10 mL, Intravenous, 2 times per day  sodium chloride flush 0.9 % injection 10 mL, 10 mL, Intravenous, PRN  ondansetron (ZOFRAN) injection 4 mg, 4 mg, Intravenous, Q6H PRN  enoxaparin (LOVENOX) injection 40 mg, 40 mg, Subcutaneous, Q24H  glucose (GLUTOSE) 40 % oral gel 15 g, 15 g, Oral, PRN  dextrose 50 % IV solution, 12.5 g, Intravenous, PRN  glucagon Wt 270 lb (122.5 kg)   SpO2 93%   BMI 33.75 kg/m²      Vascular: Dorsalis pedis and posterior tibial pulses are easily palpable bilaterally. Skin temperature is warm to warm from proximal tibial tuberosity to distal digits. CFT brisk to exposed digits. Edema nonpitting left greater than right. Hair growth absent. Quality of skin xerotic.      Dermatologic: Nails 1-5 bilaterally are thickened, elongated and dystrophic, with presence of subungual debris. Webspaces 1-4 bilaterally are minimally macerated. . Hyperkeratotic wounds noted to dorsal medial IPJ of left hallux wound has a granular base and probes to bone. No discharge was noted but some malodor was present. Open wound to medial left hallux at MPJ with granular base. Hemosiderin deposits are noted to left lower extremity consistent with chronic venous stasis.       Neurovascular: Gross sensation absent bilaterally.     Musculoskeletal: Muscle strength testing deferred. Pain on palpation diffusely to bilateral feet consistent with neuropathic pain. minimal pain on calf squeeze. ASSESSMENT: Pt. is a 60 y.o. male with:  Principle  Diabetic foot ulcer with sepsis left foot with cellulitis    Chronic  Patient Active Problem List   Diagnosis    Osteomyelitis of ankle or foot, acute, left ([facility] [address]    SIRS (systemic inflammatory response syndrome) (HCC)    Type 2 diabetes mellitus with circulatory disorder (HCC)    Hyponatremia    Hypomagnesemia       PLAN:     Patient was examined and evaluated  Continue daily dressing changes consisting of betadine with DSD with betadine swabbed in between toes. Continue IV Antibiotics rocephin and vancomycin, infectious Disease consulted  Patient may weight-bear to the heel  Discussed with patient at length about possibility of surgery to deal with bone infection of left foot.   If patient stabilizes with medical treatment definitive surgical procedure could be done in [place] with a foot and ankle specialist.  Chris Pedroza will continue to follow    Cynthia Guzman DPM  Podiatric Surgical Resident  9/22/2019   8:20 AM

## 2019-09-23 LAB
ANION GAP SERPL CALCULATED.3IONS-SCNC: 12 MEQ/L (ref 8–16)
BASOPHILS # BLD: 0.3 %
BASOPHILS ABSOLUTE: 0 THOU/MM3 (ref 0–0.1)
BUN BLDV-MCNC: 7 MG/DL (ref 7–22)
CALCIUM SERPL-MCNC: 8.6 MG/DL (ref 8.5–10.5)
CHLORIDE BLD-SCNC: 98 MEQ/L (ref 98–111)
CO2: 23 MEQ/L (ref 23–33)
CREAT SERPL-MCNC: 0.8 MG/DL (ref 0.4–1.2)
EOSINOPHIL # BLD: 2 %
EOSINOPHILS ABSOLUTE: 0.2 THOU/MM3 (ref 0–0.4)
ERYTHROCYTE [DISTWIDTH] IN BLOOD BY AUTOMATED COUNT: 12.7 % (ref 11.5–14.5)
ERYTHROCYTE [DISTWIDTH] IN BLOOD BY AUTOMATED COUNT: 48.7 FL (ref 35–45)
GFR SERPL CREATININE-BSD FRML MDRD: > 90 ML/MIN/1.73M2
GLUCOSE BLD-MCNC: 129 MG/DL (ref 70–108)
GLUCOSE BLD-MCNC: 130 MG/DL (ref 70–108)
GLUCOSE BLD-MCNC: 175 MG/DL (ref 70–108)
GLUCOSE BLD-MCNC: 200 MG/DL (ref 70–108)
GLUCOSE BLD-MCNC: 95 MG/DL (ref 70–108)
HCT VFR BLD CALC: 37.2 % (ref 42–52)
HEMOGLOBIN: 12.7 GM/DL (ref 14–18)
IMMATURE GRANS (ABS): 0.05 THOU/MM3 (ref 0–0.07)
IMMATURE GRANULOCYTES: 0 %
LYMPHOCYTES # BLD: 12.8 %
LYMPHOCYTES ABSOLUTE: 1.5 THOU/MM3 (ref 1–4.8)
MCH RBC QN AUTO: 35.3 PG (ref 26–33)
MCHC RBC AUTO-ENTMCNC: 34.1 GM/DL (ref 32.2–35.5)
MCV RBC AUTO: 103.3 FL (ref 80–94)
MONOCYTES # BLD: 8.1 %
MONOCYTES ABSOLUTE: 0.9 THOU/MM3 (ref 0.4–1.3)
NUCLEATED RED BLOOD CELLS: 0 /100 WBC
PLATELET # BLD: 162 THOU/MM3 (ref 130–400)
PMV BLD AUTO: 10.1 FL (ref 9.4–12.4)
POTASSIUM SERPL-SCNC: 3.8 MEQ/L (ref 3.5–5.2)
RBC # BLD: 3.6 MILL/MM3 (ref 4.7–6.1)
SEG NEUTROPHILS: 76.4 %
SEGMENTED NEUTROPHILS ABSOLUTE COUNT: 8.9 THOU/MM3 (ref 1.8–7.7)
SODIUM BLD-SCNC: 133 MEQ/L (ref 135–145)
VANCOMYCIN TROUGH: 10.9 UG/ML (ref 5–15)
WBC # BLD: 11.7 THOU/MM3 (ref 4.8–10.8)

## 2019-09-23 PROCEDURE — 6360000002 HC RX W HCPCS: Performed by: PHARMACIST

## 2019-09-23 PROCEDURE — 80048 BASIC METABOLIC PNL TOTAL CA: CPT

## 2019-09-23 PROCEDURE — 82948 REAGENT STRIP/BLOOD GLUCOSE: CPT

## 2019-09-23 PROCEDURE — 94760 N-INVAS EAR/PLS OXIMETRY 1: CPT

## 2019-09-23 PROCEDURE — 2580000003 HC RX 258: Performed by: PHARMACIST

## 2019-09-23 PROCEDURE — 2580000003 HC RX 258: Performed by: INTERNAL MEDICINE

## 2019-09-23 PROCEDURE — 1200000003 HC TELEMETRY R&B

## 2019-09-23 PROCEDURE — 80202 ASSAY OF VANCOMYCIN: CPT

## 2019-09-23 PROCEDURE — 6360000002 HC RX W HCPCS: Performed by: INTERNAL MEDICINE

## 2019-09-23 PROCEDURE — 85025 COMPLETE CBC W/AUTO DIFF WBC: CPT

## 2019-09-23 PROCEDURE — 99232 SBSQ HOSP IP/OBS MODERATE 35: CPT | Performed by: INTERNAL MEDICINE

## 2019-09-23 PROCEDURE — 36415 COLL VENOUS BLD VENIPUNCTURE: CPT

## 2019-09-23 PROCEDURE — 6370000000 HC RX 637 (ALT 250 FOR IP): Performed by: INTERNAL MEDICINE

## 2019-09-23 RX ADMIN — ENOXAPARIN SODIUM 40 MG: 40 INJECTION SUBCUTANEOUS at 14:00

## 2019-09-23 RX ADMIN — PIPERACILLIN AND TAZOBACTAM 3.38 G: 3; .375 INJECTION, POWDER, LYOPHILIZED, FOR SOLUTION INTRAVENOUS at 02:38

## 2019-09-23 RX ADMIN — SODIUM CHLORIDE: 9 INJECTION, SOLUTION INTRAVENOUS at 21:49

## 2019-09-23 RX ADMIN — ACETAMINOPHEN 650 MG: 325 TABLET ORAL at 12:48

## 2019-09-23 RX ADMIN — FAMOTIDINE 20 MG: 20 TABLET ORAL at 09:53

## 2019-09-23 RX ADMIN — PIPERACILLIN AND TAZOBACTAM 3.38 G: 3; .375 INJECTION, POWDER, LYOPHILIZED, FOR SOLUTION INTRAVENOUS at 13:56

## 2019-09-23 RX ADMIN — VANCOMYCIN HYDROCHLORIDE 1750 MG: 5 INJECTION, POWDER, LYOPHILIZED, FOR SOLUTION INTRAVENOUS at 09:48

## 2019-09-23 RX ADMIN — INSULIN LISPRO 2 UNITS: 100 INJECTION, SOLUTION INTRAVENOUS; SUBCUTANEOUS at 14:01

## 2019-09-23 RX ADMIN — VANCOMYCIN HYDROCHLORIDE 2000 MG: 1 INJECTION, POWDER, LYOPHILIZED, FOR SOLUTION INTRAVENOUS at 21:49

## 2019-09-23 ASSESSMENT — PAIN DESCRIPTION - FREQUENCY
FREQUENCY: CONTINUOUS
FREQUENCY: INTERMITTENT

## 2019-09-23 ASSESSMENT — PAIN DESCRIPTION - DESCRIPTORS
DESCRIPTORS: ACHING
DESCRIPTORS: ACHING

## 2019-09-23 ASSESSMENT — PAIN DESCRIPTION - LOCATION
LOCATION: HEAD
LOCATION: LEG

## 2019-09-23 ASSESSMENT — PAIN SCALES - GENERAL
PAINLEVEL_OUTOF10: 3
PAINLEVEL_OUTOF10: 2
PAINLEVEL_OUTOF10: 3
PAINLEVEL_OUTOF10: 0

## 2019-09-23 ASSESSMENT — PAIN DESCRIPTION - PROGRESSION
CLINICAL_PROGRESSION: NOT CHANGED
CLINICAL_PROGRESSION: NOT CHANGED

## 2019-09-23 ASSESSMENT — PAIN DESCRIPTION - PAIN TYPE
TYPE: ACUTE PAIN
TYPE: ACUTE PAIN

## 2019-09-23 ASSESSMENT — PAIN DESCRIPTION - ONSET
ONSET: ON-GOING
ONSET: ON-GOING

## 2019-09-23 ASSESSMENT — PAIN DESCRIPTION - ORIENTATION
ORIENTATION: OTHER (COMMENT)
ORIENTATION: LEFT

## 2019-09-23 ASSESSMENT — PAIN - FUNCTIONAL ASSESSMENT: PAIN_FUNCTIONAL_ASSESSMENT: PREVENTS OR INTERFERES WITH ALL ACTIVE AND SOME PASSIVE ACTIVITIES

## 2019-09-23 NOTE — PROGRESS NOTES
(LOVENOX) injection 40 mg, 40 mg, Subcutaneous, Q24H  glucose (GLUTOSE) 40 % oral gel 15 g, 15 g, Oral, PRN  dextrose 50 % IV solution, 12.5 g, Intravenous, PRN  glucagon (rDNA) injection 1 mg, 1 mg, Intramuscular, PRN  dextrose 5 % solution, 100 mL/hr, Intravenous, PRN  0.9 % sodium chloride infusion, , Intravenous, Continuous  potassium chloride (KLOR-CON M) extended release tablet 40 mEq, 40 mEq, Oral, PRN **OR** potassium bicarb-citric acid (EFFER-K) effervescent tablet 40 mEq, 40 mEq, Oral, PRN **OR** potassium chloride 10 mEq/100 mL IVPB (Peripheral Line), 10 mEq, Intravenous, PRN  magnesium sulfate 1 g in dextrose 5 % 100 mL IVPB, 1 g, Intravenous, PRN  polyethylene glycol (GLYCOLAX) packet 17 g, 17 g, Oral, Daily  famotidine (PEPCID) tablet 20 mg, 20 mg, Oral, BID  acetaminophen (TYLENOL) tablet 650 mg, 650 mg, Oral, Q4H PRN  insulin lispro (HUMALOG) injection vial 0-12 Units, 0-12 Units, Subcutaneous, TID WC  insulin lispro (HUMALOG) injection vial 0-6 Units, 0-6 Units, Subcutaneous, Nightly  vancomycin (VANCOCIN) 1,750 mg in dextrose 5 % 500 mL IVPB, 1,750 mg, Intravenous, Q12H  vancomycin (VANCOCIN) intermittent dosing (placeholder), , Other, RX Placeholder    Objective     BP (!) 94/48   Pulse 75   Temp 99.4 °F (37.4 °C) (Oral)   Resp 16   Ht 6' 3\" (1.905 m)   Wt 270 lb (122.5 kg)   SpO2 92%   BMI 33.75 kg/m²      I/O:    Intake/Output Summary (Last 24 hours) at 9/23/2019 0825  Last data filed at 9/23/2019 0600  Gross per 24 hour   Intake 3356.03 ml   Output 1300 ml   Net 2056.03 ml              Wt Readings from Last 3 Encounters:   09/21/19 270 lb (122.5 kg)       LABS:    Recent Labs     09/22/19  0625 09/23/19  0641   WBC 16.2* 11.7*   HGB 12.8* 12.7*   HCT 37.9* 37.2*    162        Recent Labs     09/22/19  0625   *   K 3.7   CL 99   CO2 23   BUN 7   CREATININE 0.8        Recent Labs     09/21/19  0928   PROT 7.0      No results for input(s): CKTOTAL, CKMB, CKMBINDEX, TROPONINI in the last 72 hours. Focused Lower Extremity Examination:    Vitals:    BP (!) 94/48   Pulse 75   Temp 99.4 °F (37.4 °C) (Oral)   Resp 16   Ht 6' 3\" (1.905 m)   Wt 270 lb (122.5 kg)   SpO2 92%   BMI 33.75 kg/m²      Vascular: Dorsalis pedis and posterior tibial pulses are easily palpable bilaterally. Skin temperature is warm to warm from proximal tibial tuberosity to distal digits. CFT brisk to exposed digits. Edema nonpitting left greater than right. Hair growth absent. Quality of skin xerotic.      Dermatologic: Nails 1-5 bilaterally are thickened, elongated and dystrophic, with presence of subungual debris. Webspaces 1-4 bilaterally are minimally macerated. . Hyperkeratotic wounds noted to dorsal medial IPJ of left hallux wound has a granular base and probes to bone. No discharge was noted but some malodor was present. Open wound to medial left hallux at MPJ with granular base. Hemosiderin deposits are noted to left lower extremity consistent with chronic venous stasis.     Neurovascular: Gross sensation absent bilaterally.     Musculoskeletal: Muscle strength testing deferred. Pain on palpation diffusely to bilateral feet consistent with neuropathic pain. minimal pain on calf squeeze. Xr Chest Standard (2 Vw)    Result Date: 9/21/2019     FINDINGS: The lungs are clear. The cardiac silhouette and pulmonary vasculature are within normal limits. There is no significant pleural effusion or pneumothorax. Visualized portions of the upper abdomen are within normal limits. The osseous structures are intact. No acute fractures or suspicious osseous lesions. There is no acute intrathoracic process. Xr Foot Left (min 3 Views)    Result Date: 9/21/2019    Irregularity of the soft tissues and the proximal and distal phalanges of the first digit. These findings could be on the basis of osteomyelitis. **This report has been created using voice recognition software.  It may contain minor errors

## 2019-09-24 VITALS
WEIGHT: 270 LBS | BODY MASS INDEX: 33.57 KG/M2 | HEART RATE: 72 BPM | TEMPERATURE: 99.6 F | SYSTOLIC BLOOD PRESSURE: 128 MMHG | DIASTOLIC BLOOD PRESSURE: 62 MMHG | HEIGHT: 75 IN | RESPIRATION RATE: 18 BRPM | OXYGEN SATURATION: 93 %

## 2019-09-24 LAB
AEROBIC CULTURE: ABNORMAL
ANAEROBIC CULTURE: ABNORMAL
GLUCOSE BLD-MCNC: 113 MG/DL (ref 70–108)
GLUCOSE BLD-MCNC: 186 MG/DL (ref 70–108)
GRAM STAIN RESULT: ABNORMAL
ORGANISM: ABNORMAL

## 2019-09-24 PROCEDURE — 2580000003 HC RX 258: Performed by: INTERNAL MEDICINE

## 2019-09-24 PROCEDURE — 99239 HOSP IP/OBS DSCHRG MGMT >30: CPT | Performed by: INTERNAL MEDICINE

## 2019-09-24 PROCEDURE — 6370000000 HC RX 637 (ALT 250 FOR IP): Performed by: INTERNAL MEDICINE

## 2019-09-24 PROCEDURE — 82948 REAGENT STRIP/BLOOD GLUCOSE: CPT

## 2019-09-24 PROCEDURE — 6360000002 HC RX W HCPCS: Performed by: INTERNAL MEDICINE

## 2019-09-24 RX ORDER — CEPHALEXIN 500 MG/1
500 CAPSULE ORAL 4 TIMES DAILY
Qty: 120 CAPSULE | Refills: 0 | Status: SHIPPED | OUTPATIENT
Start: 2019-09-24 | End: 2019-10-24

## 2019-09-24 RX ORDER — CEPHALEXIN 500 MG/1
500 CAPSULE ORAL EVERY 6 HOURS SCHEDULED
Status: DISCONTINUED | OUTPATIENT
Start: 2019-09-24 | End: 2019-09-24 | Stop reason: HOSPADM

## 2019-09-24 RX ADMIN — ACETAMINOPHEN 650 MG: 325 TABLET ORAL at 04:32

## 2019-09-24 RX ADMIN — ACETAMINOPHEN 650 MG: 325 TABLET ORAL at 00:44

## 2019-09-24 RX ADMIN — PIPERACILLIN AND TAZOBACTAM 3.38 G: 3; .375 INJECTION, POWDER, LYOPHILIZED, FOR SOLUTION INTRAVENOUS at 00:38

## 2019-09-24 ASSESSMENT — PAIN SCALES - GENERAL
PAINLEVEL_OUTOF10: 0
PAINLEVEL_OUTOF10: 2
PAINLEVEL_OUTOF10: 0

## 2019-09-24 NOTE — CARE COORDINATION
9/24/19, 12:32 PM    Discharge plan discussed by  and . Discharge plan reviewed with patient/ family. Patient/ family verbalize understanding of discharge plan and are in agreement with plan. Understanding was demonstrated using the teach back method. Dr Orly Escalante discharging today. Dr Bashir Azar said long term oral Keflex for OM. Deshawn Mcneil is from [place], but working in [place] for a couple more weeks. He is planning to stay in [place] for now. He is trying to get a friend to pick him up. Second option: cab at discharge. He said that he is planning to followup with a PCP and a podiatrist in [place] when he returns there.

## 2019-09-24 NOTE — PROGRESS NOTES
Progress note: Infectious diseases    Patient - Hua Penaloza,  Age - 60 y.o.    - 1960      Room Number - 7K-20/020-A   MRN -  [id number]   Acct # - [de-identified]  Date of Admission -  2019  8:21 AM    SUBJECTIVE:   He has no complaints. He wants to go to [place] [address]    height is 6' 3\" (1.905 m) and weight is 270 lb (122.5 kg). His oral temperature is 100.3 °F (37.9 °C). His blood pressure is 112/65 and his pulse is 76. His respiration is 18 and oxygen saturation is 91%.        Wt Readings from Last 3 Encounters:   19 270 lb (122.5 kg)       I/O (24 Hours)    Intake/Output Summary (Last 24 hours) at 2019 0816  Last data filed at 2019 0430  Gross per 24 hour   Intake 3052 ml   Output 1550 ml   Net 1502 ml       General Appearance  Awake, alert, oriented,  not  In acute distress  HEENT - normocephalic, atraumatic, pink conjunctiva,  anicteric sclera  Neck - Supple, no mass  Lungs -  Bilateral good air entry, no rhonchi, no wheeze  Cardiovascular - Heart sounds are normal.     Abdomen - soft, not distended, nontender,   Neurologic -oriented  Skin - No bruising or bleeding  Extremities - the swelling and redness is less, dry clean dressing    MEDICATIONS:      vancomycin  2,000 mg Intravenous Q12H    piperacillin-tazobactam  3.375 g Intravenous Q8H    sodium chloride flush  10 mL Intravenous 2 times per day    enoxaparin  40 mg Subcutaneous Q24H    polyethylene glycol  17 g Oral Daily    famotidine  20 mg Oral BID    insulin lispro  0-12 Units Subcutaneous TID WC    insulin lispro  0-6 Units Subcutaneous Nightly    vancomycin (VANCOCIN) intermittent dosing (placeholder)   Other RX Placeholder      dextrose      sodium chloride 75 mL/hr at 19 0129     sodium chloride flush, ondansetron, glucose, dextrose, glucagon (rDNA), dextrose, potassium chloride **OR** potassium

## 2019-09-24 NOTE — PROGRESS NOTES
Podiatric Progress Note  Frederic Gong  Subjective :     9.24.19   Patient seen at bedside on behalf of Dr. Demi Sue. Patient reports he would like to go back to [place] for treatment of his toe, as he realizes he will have to take a couple of weeks off of work. Patient denies any N/V/F/C. No other pedal complaints at this time. 9/23/19  Patient seen at bedside this morning on behalf of Dr. Demi Sue. Patient has no pain at this time. Denies any N/V/D/F/C/SOB/CP. No questions at this time. 9/22/19  Patient seen chair-side today behalf of Dr. Demi Sue. Patient appeared pleasant, was oriented to person, place and time and in no acute distress. Pt states his fever has been up and down all night ranging from 102 to 99. Pt relates he is feeling much better today and is in less pain. Patient denies any /V/C/SOB or CP but admits to fevers and nausea. Patient has no further pedal concerns at this point in time. HISTORY OF PRESENT ILLNESS:                The patient is a 60 y.o. male with significant past medical history of previous DVT history of multiple knee operations and diabetes mellitus uncontrolled who being seen at bedside on behalf of of Dr. Demi Sue. She presents today with a hot swollen left foot with a wound on the medial side of his foot and big toe. Patient states he is from [place] and is here for work. He woke up this morning and had fever and chills and felt ill with an incredible amount of pain in his left leg and groin. Patient states that he does not see a podiatrist back in [place] and does not really do much for his feet. He says occasionally that he will do Epson salt soaks and put powder on his feet. Patient denies any nausea, vomiting chest pain or shortness of breath. Patient also admits to numbness and tingling in both feet.      Current Medications:    Current Facility-Administered Medications: vancomycin (VANCOCIN) 2,000 mg in dextrose 5 % 500 mL IVPB, 2,000 mg, Intravenous, Q12H  piperacillin-tazobactam (ZOSYN) 3.375 g in dextrose 5 % 50 mL IVPB extended infusion (mini-bag), 3.375 g, Intravenous, Q8H  sodium chloride flush 0.9 % injection 10 mL, 10 mL, Intravenous, 2 times per day  sodium chloride flush 0.9 % injection 10 mL, 10 mL, Intravenous, PRN  ondansetron (ZOFRAN) injection 4 mg, 4 mg, Intravenous, Q6H PRN  enoxaparin (LOVENOX) injection 40 mg, 40 mg, Subcutaneous, Q24H  glucose (GLUTOSE) 40 % oral gel 15 g, 15 g, Oral, PRN  dextrose 50 % IV solution, 12.5 g, Intravenous, PRN  glucagon (rDNA) injection 1 mg, 1 mg, Intramuscular, PRN  dextrose 5 % solution, 100 mL/hr, Intravenous, PRN  0.9 % sodium chloride infusion, , Intravenous, Continuous  potassium chloride (KLOR-CON M) extended release tablet 40 mEq, 40 mEq, Oral, PRN **OR** potassium bicarb-citric acid (EFFER-K) effervescent tablet 40 mEq, 40 mEq, Oral, PRN **OR** potassium chloride 10 mEq/100 mL IVPB (Peripheral Line), 10 mEq, Intravenous, PRN  magnesium sulfate 1 g in dextrose 5 % 100 mL IVPB, 1 g, Intravenous, PRN  polyethylene glycol (GLYCOLAX) packet 17 g, 17 g, Oral, Daily  famotidine (PEPCID) tablet 20 mg, 20 mg, Oral, BID  acetaminophen (TYLENOL) tablet 650 mg, 650 mg, Oral, Q4H PRN  insulin lispro (HUMALOG) injection vial 0-12 Units, 0-12 Units, Subcutaneous, TID WC  insulin lispro (HUMALOG) injection vial 0-6 Units, 0-6 Units, Subcutaneous, Nightly  vancomycin (VANCOCIN) intermittent dosing (placeholder), , Other, RX Placeholder    Objective     /65   Pulse 76   Temp 100.3 °F (37.9 °C) (Oral)   Resp 18   Ht 6' 3\" (1.905 m)   Wt 270 lb (122.5 kg)   SpO2 91%   BMI 33.75 kg/m²      I/O:    Intake/Output Summary (Last 24 hours) at 9/24/2019 9743  Last data filed at 9/24/2019 0430  Gross per 24 hour   Intake 3052 ml   Output 1550 ml   Net 1502 ml              Wt Readings from Last 3 Encounters:   09/21/19 270 lb (122.5 kg)       LABS:    Recent Labs     09/22/19  0625 09/23/19  0641   WBC 16.2* 11.7*

## 2019-09-26 LAB
BLOOD CULTURE, ROUTINE: NORMAL
BLOOD CULTURE, ROUTINE: NORMAL